# Patient Record
(demographics unavailable — no encounter records)

---

## 2024-10-25 NOTE — HISTORY OF PRESENT ILLNESS
[Delivery Date: ___] : on [unfilled] [Breastfeeding] : currently nursing [None] : No associated symptoms are reported [Slow Progress] : is progressing slowly [FreeTextEntry8] : pt presents for follow up, pt is s/p primary C/S. 9/28/24,and was seen recently in the E.R. on 10/21/24,, left nephrolithiasis identified. . pt was treated with I.V. antibiotics. and discharged yesterday, from [facility]. pt feels better today. incidentally, on sono done at [facility], pt was told that she had " gas" inside the uterus on imaging for which patient was told to f/u with GYN for concern for infection.  [de-identified] : Left NephrolithiasisUTI.  3 1/2 weeks PP.  [de-identified] : f/u for  6 week PP visit. repeat Urine C&S today, reviewed pelvic sono done at [facility], findings are c/w postoperative state of air in the uterine cavity. , pt reassured,

## 2024-11-05 NOTE — HISTORY OF PRESENT ILLNESS
[Complications:___] : no complications [Resumed Menses] : has not resumed her menses [Resumed [place]] : has not resumed intercourse [Currently Experiencing] : The patient is currently experiencing symptoms. [Clean/Dry/Intact] : clean, dry and intact [Erythema] : not erythematous [Swelling] : not swollen [Dehiscence] : not dehisced [Healed] : healed [Back to Normal] : is back to normal in size [None] : no vaginal bleeding [Normal] : the vagina was normal [Cervix Sample Taken] : cervical sample not taken for a Pap smear [Examination Of The Breasts] : breasts are normal [Slow Progress] : is progressing slowly [FreeTextEntry8] : 29 y.o now P 1001 s/p primary C/S 9/28/24 live male , " Geovanna"  7lbs. 13oz  now  5 1/2 weeks PP . pt is experiencing urinary incontinence, post delivery and has gone for pelvic floor rehab and will now be referred to Urogyne for additional management.  [de-identified] : urinary incontinence.  [de-identified] : 5 1/2 weeks PP s/p primary C/S with vertical extensions of hysterotomy.  Urinary incontinence [de-identified] : pt had  pelvic floor therapy at [facility]. will refer now to Urogyne for further assessment.

## 2024-11-19 NOTE — PHYSICAL EXAM
[Chaperone Present] : A chaperone was present in the examining room during all aspects of the physical examination [25067] : A chaperone was present during the pelvic exam. [FreeTextEntry2] : DAVE Tinajero [FreeTextEntry1] : General: Well, appearing, no acute distress HEENT: Normocephalic, atraumatic Respiratory: Speaking in full sentences comfortably, normal work of breathing and no cough during visit Extremities: No upper extremity edema noted Skin: No obvious rash or skin lesions Neuro: Alert and oriented x 3, speech is fluent, normal rate Psych: Normal mood and affect [No Lesions] : no lesions were seen on the external genitalia [Labia Majora] : were normal [Labia Minora] : were normal [Normal Appearance] : general appearance was normal [1] : 1 [Normal] : normal [Post Void Residual ____ml] : post void residual was [unfilled] ml [de-identified] : via bladder scan

## 2024-11-19 NOTE — DISCUSSION/SUMMARY
[FreeTextEntry1] : #MINO #Pelvic floor weakness postpartum - We discussed etiologies of her symptoms - Reviewed normal healing postpartum and recommended continuing with pelvic floor therapy as she has seen some minor improvements with only 3 sessions so far. - We discussed that given proximity to recent delivery, management options are limited at this time to therapy as above or placement of a pessary. We discussed alternative management (bulking/sling), however would defer until additional recovery from recent delivery.  - Follow-up in 3 months to re-evaluate.

## 2024-11-19 NOTE — HISTORY OF PRESENT ILLNESS
[FreeTextEntry1] : GEORGE LIZARRAGA is a 29-year-old P1 presenting for evaluation of urinary leakage. Pt is 6 weeks postpartum s/p primary C/S 9/28/24 for labor dystocia (arrest of dilation). She reports that she delivery she has been having difficulty holding her urine. She leaks with any movement. She also reports not feeling the urge to urinate as before. She denies any sensation of vaginal bulge or issues with bowel movements. She has started pelvic floor therapy and has done 3 sessions so far. She feels very slight improvement so far, but incontinence is still significant. She wears pads 24/7.

## 2025-05-19 NOTE — HISTORY OF PRESENT ILLNESS
[FreeTextEntry1] : 5/19/25: Christopher presents for follow-up of MINO. She is now 6 months postpartum from C/S. At last visit, she had started pelvic floor physical therapy and was already seeing minor improvements in her symptoms. Today, she reports persistent urinary leakage with exertion. No issues with urgency leakage. She is still doing well pelvic floor therapy.   HPI 11/19/24: CHRISTOPHER LIZARRAGA is a 29-year-old P1 presenting for evaluation of urinary leakage. Pt is 6 weeks postpartum s/p primary C/S 9/28/24 for labor dystocia (arrest of dilation). She reports that she delivery she has been having difficulty holding her urine. She leaks with any movement. She also reports not feeling the urge to urinate as before. She denies any sensation of vaginal bulge or issues with bowel movements. She has started pelvic floor therapy and has done 3 sessions so far. She feels very slight improvement so far, but incontinence is still significant. She wears pads 24/7.

## 2025-05-19 NOTE — ASSESSMENT
[FreeTextEntry1] : Christopher continues to experience significant MINO despite several months of pelvic floor therapy. Management options were reviewed again today, including continued pelvic floor exercises or physical therapy, intravaginal incontinence device (pessary, Poise Impressa), periurethral bulking agents, and surgery. She is interested in trying vaginal estrogen therapy given that she is breastfeeding and is concerned that hypoestrogenic state may have an effect. She would also like to try an incontinence pessary. Rx for estradiol sent to her pharmacy and she was advised to apply nightly for two weeks followed by twice weekly. She will return for IPE.

## 2025-06-26 NOTE — HISTORY OF PRESENT ILLNESS
[FreeTextEntry1] : Pt presents for IPE for MINO. Has been doing pelvic floor PT as well. Reports urinary frequency - every 1.5-2.5 hours depending on water intake. Drinks 30-40oz of water daily.

## 2025-06-26 NOTE — PHYSICAL EXAM
[Chaperoned Physical Exam] : A chaperone was present in the examining room during all aspects of the physical examination. [MA] : MA [No Lesions] : no lesions were seen on the external genitalia [Normal Appearance] : general appearance was normal [Normal] : normal [FreeTextEntry2] : Jesus [FreeTextEntry1] : General: Well, appearing, no acute distress HEENT: Normocephalic, atraumatic Respiratory: Speaking in full sentences comfortably, normal work of breathing and no cough during visit Extremities: No upper extremity edema noted Skin: No obvious rash or skin lesions Neuro: Alert and oriented x 3, speech is fluent, normal rate Psych: Normal mood and affect

## 2025-06-26 NOTE — DISCUSSION/SUMMARY
[FreeTextEntry1] : #Prolapse - Patient was fitted with #4 incontinence dish. - Pt was educated on how to assess if the pessary is sitting properly in the vaginal vault. - We discussed the normal occurrence of vaginal discharge and minimal vaginal bleeding after placement. - Aware to call the office with any questions or concerns.  #Urinary frequency - Discussed possible etiology. - May consider pharmacologic therapy, will re-assess at next visit.  RTO in 4-6 weeks.